# Patient Record
Sex: MALE | Race: WHITE | URBAN - METROPOLITAN AREA
[De-identification: names, ages, dates, MRNs, and addresses within clinical notes are randomized per-mention and may not be internally consistent; named-entity substitution may affect disease eponyms.]

---

## 2024-02-12 DIAGNOSIS — M25.561 RIGHT KNEE PAIN, UNSPECIFIED CHRONICITY: Primary | ICD-10-CM

## 2024-02-13 ENCOUNTER — OFFICE VISIT (OUTPATIENT)
Dept: ORTHOPEDIC SURGERY | Age: 44
End: 2024-02-13
Payer: COMMERCIAL

## 2024-02-13 VITALS — WEIGHT: 214 LBS | BODY MASS INDEX: 32.43 KG/M2 | TEMPERATURE: 98 F | HEIGHT: 68 IN

## 2024-02-13 DIAGNOSIS — G89.29 CHRONIC PAIN OF RIGHT KNEE: ICD-10-CM

## 2024-02-13 DIAGNOSIS — M25.561 CHRONIC PAIN OF RIGHT KNEE: ICD-10-CM

## 2024-02-13 DIAGNOSIS — M17.11 PRIMARY OSTEOARTHRITIS OF RIGHT KNEE: Primary | ICD-10-CM

## 2024-02-13 DIAGNOSIS — M25.461 EFFUSION OF RIGHT KNEE: ICD-10-CM

## 2024-02-13 PROCEDURE — 99203 OFFICE O/P NEW LOW 30 MIN: CPT | Performed by: ORTHOPAEDIC SURGERY

## 2024-02-13 NOTE — PROGRESS NOTES
Joao Ambriz (:  1980) is a 43 y.o. male,New patient, here for evaluation of the following chief complaint(s):  Knee Pain (Right knee pain ongoing. Constant pain, worsens with weightbearing and stair climbing. No treatments to date. )         ASSESSMENT/PLAN:  1. Primary osteoarthritis of right knee  2. Chronic pain of right knee  3. Effusion of right knee      This is a 43 y.o. year old male with Primary osteoarthritis of right knee [M17.11] with pain and effusion  I discussed a variety of treatment options with the patient today including observation, NSAID, low impact exercise, weight loss, physical therapy and injections. I also discussed the risks, benefits, alternatives and subsequent rehab with surgery. The patient would like to proceed with PT.    The potential benefits of outpatient physical therapy for improved ROM, strengthening, and pain relief modalities were reviewed. The patient agrees to proceed and an outpatient PT referral was made.    Patient states he is afraid of needles, very young for TKA but likely will need in future.  Will attempt nonop for as long as we can due to age.    Patient states he cannot have pain meds post op      Return in about 3 months (around 2024).         Subjective   SUBJECTIVE/OBJECTIVE:  HPI    43-year-old male here today to discuss his right knee.  He states that been going on for a long time and getting worse.  He rates pain 6 out of 10.  He attends a sober house.  He has had 1 relapse in the last 5 months.  He has really minimal treatments on the knee.  He does take over-the-counter Tylenol with minimal relief.  He states the pain is worsening.  The pain is a dull and achy pain.  It is made worse by walking and standing.  He denies nighttime pain at this time.  Here today to discuss further treatment options.    History reviewed. No pertinent past medical history.  History reviewed. No pertinent surgical history.   History reviewed. No pertinent

## 2024-03-05 ENCOUNTER — EVALUATION (OUTPATIENT)
Dept: PHYSICAL THERAPY | Age: 44
End: 2024-03-05
Payer: COMMERCIAL

## 2024-03-05 DIAGNOSIS — M17.11 PRIMARY OSTEOARTHRITIS OF RIGHT KNEE: Primary | ICD-10-CM

## 2024-03-05 PROCEDURE — 97112 NEUROMUSCULAR REEDUCATION: CPT | Performed by: PHYSICAL THERAPIST

## 2024-03-05 PROCEDURE — 97162 PT EVAL MOD COMPLEX 30 MIN: CPT | Performed by: PHYSICAL THERAPIST

## 2024-03-05 NOTE — PROGRESS NOTES
Physical Therapy Daily Treatment Note    Date: 3/5/2024  Patient Name: Joao Ambriz  Nickname: Reba  : 1980   MRN: [id number]  DOInjury: Knee - chronic, heel 1 year   DOSx: --  Referring Provider: [place] [address]     Medical Diagnosis:      Diagnosis Orders   1. Primary osteoarthritis of right knee          Joao (Reba) has plantar fasciitis that is a bigger problem for him than knee pain. He has good foot and knee motion. Strength is good. Treatment will consist of plantar fascia stretching, plantar fascia loading program, and knee loading program focusing on heavy knee extension with T-band for home. The goal will be development of an HEP that he can use alongside his work as a , then discharge with emphasis on long-term compliance to HEP.       Access Code: [email or web address]  URL: [email or web address]  Date: 2024  Prepared by: Sal Garcia    Exercises  - Seated Plantar Fascia Stretch  - 2 x daily - 10 reps - 10 sec hold  - Eccentric Plantar Fascia Strengthening on Step (Mirrored)  - 3 x weekly - 3 sets - 12 reps      X = TO BE PERFORMED NEXT VISIT  > = PROGRESS TO THIS    S: See aliyahal  O:   Time [id number]     Visit  Repeat outcome measure at mid point and end.    Pain Pain 0-7/10     ROM Good     Modalities         MO   Manual            Stretch      Plantar fascia stretch  R foot over L knee pulling toes into extension and foot into dorsiflexion vs stretching foot into wall using same manner.  Patient choice.    10 x 10 sec         TE      TE   Exercise      Calf Raises - from floor with towel roll under toes pushing them into extension 3 seconds up  3 seconds down  1 min rest between sets     3 x 12     Calf Raises - edge of step with towel roll under toes pushing them into extension 3 seconds up  3 seconds down  1 min rest between sets    X This progression should be enough.  If not, we can add external load.         
3-4 weeks.   Certification period from 3/5/2024  to 6/5/2024.    I have reviewed the Plan of Care established for skilled therapy services and certify that the services are required and that they will be provided while the patient is under my care.    Physician's Comments/Revisions:               Physician's Printed Name:                                           Physician's Signature:                                                               Date:

## 2024-11-01 ENCOUNTER — OFFICE VISIT (OUTPATIENT)
Age: 44
End: 2024-11-01
Payer: COMMERCIAL

## 2024-11-01 VITALS — WEIGHT: 214 LBS | BODY MASS INDEX: 33.59 KG/M2 | HEIGHT: 67 IN | TEMPERATURE: 98.6 F

## 2024-11-01 DIAGNOSIS — S93.402A MODERATE ANKLE SPRAIN, LEFT, INITIAL ENCOUNTER: Primary | ICD-10-CM

## 2024-11-01 PROCEDURE — 99213 OFFICE O/P EST LOW 20 MIN: CPT

## 2024-11-01 NOTE — PROGRESS NOTES
extremities.  There is not varicosities noted in the distal extremities.      Lymph:    Upon palpation,  there is no lymphadenopathy noted in bilateral lower extremities.      Musculoskeletal:    Gait: antalgic; examination of the nails and digits reveal no cyanosis or clubbing.      Ankle Exam:    Upon inspection and palpation of the Left ankle,  there is  deformity noted,  moderate swelling, no ecchymosis, has pain on palpation of medial and lateral ankle.  ROM R/L : DF wnl/wnl; PF wnl/wnl;  INV wnl/wnl, ASHLEY wnl/wnl.   This exam was compared bilaterally.       Right Ankle:   (-) Anterior Drawer ,  (-) Posterior Drawer ,  (-) Squeeze test,  (-) External Rotation, (-) Eversion test , (-) Vang Test     Left ankle:   (-) Anterior Drawer ,(-)  Posterior Drawer ,(-) Squeeze test,(-) External Rotation (-) Eversion test, (-) Vang Test.      Foot exam- visual inspection reveals warm, good capillary refill.   ROM inversion/eversion full range of motion, abduction/adduction full range of motion, ROM in MTP/PIP/DIP full range of motion.       Xrays:No acute fracture  Radiographic findings reviewed with patient      Impression:  Encounter Diagnosis   Name Primary?    Moderate ankle sprain, left, initial encounter Yes         Plan:Natural history and expected course discussed. Questions answered.  Rest, ice, compression, elevation (RICE) therapy.  OTC analgesics as needed.    Ice, NSAIDs as needed  Okay to WBAT  Offered ASO, patient declined  If pain does not improve over the next week or so recommend follow up